# Patient Record
Sex: MALE | Race: BLACK OR AFRICAN AMERICAN
[De-identification: names, ages, dates, MRNs, and addresses within clinical notes are randomized per-mention and may not be internally consistent; named-entity substitution may affect disease eponyms.]

---

## 2019-12-25 VITALS — DIASTOLIC BLOOD PRESSURE: 69 MMHG | SYSTOLIC BLOOD PRESSURE: 168 MMHG

## 2022-01-01 ENCOUNTER — HOSPITAL ENCOUNTER (EMERGENCY)
Dept: HOSPITAL 61 - ER | Age: 32
Discharge: HOME | End: 2022-01-01
Payer: MEDICAID

## 2022-01-01 VITALS — BODY MASS INDEX: 31.2 KG/M2 | HEIGHT: 72 IN | WEIGHT: 230.38 LBS
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
## 2022-01-01 DIAGNOSIS — G40.909: ICD-10-CM

## 2022-01-01 DIAGNOSIS — S13.9XXA: Primary | ICD-10-CM

## 2022-01-01 DIAGNOSIS — E03.9: ICD-10-CM
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
## 2022-01-01 PROCEDURE — 99282 EMERGENCY DEPT VISIT SF MDM: CPT

## 2022-01-01 PROCEDURE — 96372 THER/PROPH/DIAG INJ SC/IM: CPT

## 2022-01-01 PROCEDURE — 99284 EMERGENCY DEPT VISIT MOD MDM: CPT
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
